# Patient Record
Sex: FEMALE | Race: WHITE | NOT HISPANIC OR LATINO
[De-identification: names, ages, dates, MRNs, and addresses within clinical notes are randomized per-mention and may not be internally consistent; named-entity substitution may affect disease eponyms.]

---

## 2017-02-23 ENCOUNTER — APPOINTMENT (OUTPATIENT)
Dept: OBGYN | Facility: CLINIC | Age: 52
End: 2017-02-23

## 2017-02-23 VITALS
BODY MASS INDEX: 25.23 KG/M2 | HEIGHT: 66 IN | WEIGHT: 157 LBS | SYSTOLIC BLOOD PRESSURE: 140 MMHG | DIASTOLIC BLOOD PRESSURE: 72 MMHG

## 2017-02-23 DIAGNOSIS — N92.4 EXCESSIVE BLEEDING IN THE PREMENOPAUSAL PERIOD: ICD-10-CM

## 2017-03-06 ENCOUNTER — APPOINTMENT (OUTPATIENT)
Dept: OBGYN | Facility: CLINIC | Age: 52
End: 2017-03-06

## 2017-03-06 VITALS
SYSTOLIC BLOOD PRESSURE: 140 MMHG | DIASTOLIC BLOOD PRESSURE: 80 MMHG | WEIGHT: 153 LBS | HEIGHT: 65 IN | BODY MASS INDEX: 25.49 KG/M2

## 2017-03-06 DIAGNOSIS — R10.2 PELVIC AND PERINEAL PAIN: ICD-10-CM

## 2017-04-27 ENCOUNTER — APPOINTMENT (OUTPATIENT)
Dept: OBGYN | Facility: CLINIC | Age: 52
End: 2017-04-27

## 2017-04-27 DIAGNOSIS — D25.9 LEIOMYOMA OF UTERUS, UNSPECIFIED: ICD-10-CM

## 2017-04-27 DIAGNOSIS — N80.9 ENDOMETRIOSIS, UNSPECIFIED: ICD-10-CM

## 2017-06-14 ENCOUNTER — EMERGENCY (EMERGENCY)
Facility: HOSPITAL | Age: 52
LOS: 1 days | Discharge: HOME | End: 2017-06-14
Attending: EMERGENCY MEDICINE | Admitting: EMERGENCY MEDICINE
Payer: COMMERCIAL

## 2017-06-14 VITALS
HEART RATE: 82 BPM | OXYGEN SATURATION: 98 % | RESPIRATION RATE: 16 BRPM | TEMPERATURE: 98 F | SYSTOLIC BLOOD PRESSURE: 151 MMHG | DIASTOLIC BLOOD PRESSURE: 97 MMHG

## 2017-06-14 VITALS
RESPIRATION RATE: 18 BRPM | OXYGEN SATURATION: 100 % | TEMPERATURE: 98 F | SYSTOLIC BLOOD PRESSURE: 150 MMHG | HEART RATE: 126 BPM | DIASTOLIC BLOOD PRESSURE: 98 MMHG

## 2017-06-14 LAB
ALBUMIN SERPL ELPH-MCNC: 4.1 G/DL — SIGNIFICANT CHANGE UP (ref 3.3–5)
ALP SERPL-CCNC: 64 U/L — SIGNIFICANT CHANGE UP (ref 40–120)
ALT FLD-CCNC: 14 U/L — SIGNIFICANT CHANGE UP (ref 4–33)
APPEARANCE UR: SIGNIFICANT CHANGE UP
AST SERPL-CCNC: 15 U/L — SIGNIFICANT CHANGE UP (ref 4–32)
BACTERIA # UR AUTO: SIGNIFICANT CHANGE UP
BASOPHILS # BLD AUTO: 0.01 K/UL — SIGNIFICANT CHANGE UP (ref 0–0.2)
BASOPHILS NFR BLD AUTO: 0.1 % — SIGNIFICANT CHANGE UP (ref 0–2)
BILIRUB SERPL-MCNC: 0.6 MG/DL — SIGNIFICANT CHANGE UP (ref 0.2–1.2)
BILIRUB UR-MCNC: NEGATIVE — SIGNIFICANT CHANGE UP
BLOOD UR QL VISUAL: HIGH
BUN SERPL-MCNC: 12 MG/DL — SIGNIFICANT CHANGE UP (ref 7–23)
CALCIUM SERPL-MCNC: 9.3 MG/DL — SIGNIFICANT CHANGE UP (ref 8.4–10.5)
CHLORIDE SERPL-SCNC: 98 MMOL/L — SIGNIFICANT CHANGE UP (ref 98–107)
CK MB BLD-MCNC: 1.04 NG/ML — SIGNIFICANT CHANGE UP (ref 1–4.7)
CK MB BLD-MCNC: 1.16 NG/ML — SIGNIFICANT CHANGE UP (ref 1–4.7)
CK MB BLD-MCNC: SIGNIFICANT CHANGE UP (ref 0–2.5)
CK MB BLD-MCNC: SIGNIFICANT CHANGE UP (ref 0–2.5)
CK SERPL-CCNC: 53 U/L — SIGNIFICANT CHANGE UP (ref 25–170)
CK SERPL-CCNC: 60 U/L — SIGNIFICANT CHANGE UP (ref 25–170)
CO2 SERPL-SCNC: 23 MMOL/L — SIGNIFICANT CHANGE UP (ref 22–31)
COLOR SPEC: SIGNIFICANT CHANGE UP
CREAT SERPL-MCNC: 0.76 MG/DL — SIGNIFICANT CHANGE UP (ref 0.5–1.3)
D DIMER BLD IA.RAPID-MCNC: < 150 NG/ML — SIGNIFICANT CHANGE UP
EOSINOPHIL # BLD AUTO: 0.01 K/UL — SIGNIFICANT CHANGE UP (ref 0–0.5)
EOSINOPHIL NFR BLD AUTO: 0.1 % — SIGNIFICANT CHANGE UP (ref 0–6)
GLUCOSE SERPL-MCNC: 111 MG/DL — HIGH (ref 70–99)
GLUCOSE UR-MCNC: NEGATIVE — SIGNIFICANT CHANGE UP
HBA1C BLD-MCNC: 5.7 % — HIGH (ref 4–5.6)
HCT VFR BLD CALC: 42.4 % — SIGNIFICANT CHANGE UP (ref 34.5–45)
HGB BLD-MCNC: 13.9 G/DL — SIGNIFICANT CHANGE UP (ref 11.5–15.5)
IMM GRANULOCYTES NFR BLD AUTO: 0.1 % — SIGNIFICANT CHANGE UP (ref 0–1.5)
KETONES UR-MCNC: NEGATIVE — SIGNIFICANT CHANGE UP
LEUKOCYTE ESTERASE UR-ACNC: HIGH
LYMPHOCYTES # BLD AUTO: 1.35 K/UL — SIGNIFICANT CHANGE UP (ref 1–3.3)
LYMPHOCYTES # BLD AUTO: 16.3 % — SIGNIFICANT CHANGE UP (ref 13–44)
MCHC RBC-ENTMCNC: 28.3 PG — SIGNIFICANT CHANGE UP (ref 27–34)
MCHC RBC-ENTMCNC: 32.8 % — SIGNIFICANT CHANGE UP (ref 32–36)
MCV RBC AUTO: 86.4 FL — SIGNIFICANT CHANGE UP (ref 80–100)
MONOCYTES # BLD AUTO: 0.34 K/UL — SIGNIFICANT CHANGE UP (ref 0–0.9)
MONOCYTES NFR BLD AUTO: 4.1 % — SIGNIFICANT CHANGE UP (ref 2–14)
MUCOUS THREADS # UR AUTO: SIGNIFICANT CHANGE UP
NEUTROPHILS # BLD AUTO: 6.58 K/UL — SIGNIFICANT CHANGE UP (ref 1.8–7.4)
NEUTROPHILS NFR BLD AUTO: 79.3 % — HIGH (ref 43–77)
NITRITE UR-MCNC: NEGATIVE — SIGNIFICANT CHANGE UP
PH UR: 7 — SIGNIFICANT CHANGE UP (ref 4.6–8)
PLATELET # BLD AUTO: 319 K/UL — SIGNIFICANT CHANGE UP (ref 150–400)
PMV BLD: 9.9 FL — SIGNIFICANT CHANGE UP (ref 7–13)
POTASSIUM SERPL-MCNC: 3.1 MMOL/L — LOW (ref 3.5–5.3)
POTASSIUM SERPL-SCNC: 3.1 MMOL/L — LOW (ref 3.5–5.3)
PROT SERPL-MCNC: 7.1 G/DL — SIGNIFICANT CHANGE UP (ref 6–8.3)
PROT UR-MCNC: 10 — SIGNIFICANT CHANGE UP
RBC # BLD: 4.91 M/UL — SIGNIFICANT CHANGE UP (ref 3.8–5.2)
RBC # FLD: 13.2 % — SIGNIFICANT CHANGE UP (ref 10.3–14.5)
RBC CASTS # UR COMP ASSIST: HIGH (ref 0–?)
SODIUM SERPL-SCNC: 138 MMOL/L — SIGNIFICANT CHANGE UP (ref 135–145)
SP GR SPEC: 1.01 — SIGNIFICANT CHANGE UP (ref 1–1.03)
SQUAMOUS # UR AUTO: SIGNIFICANT CHANGE UP
TROPONIN T SERPL-MCNC: < 0.06 NG/ML — SIGNIFICANT CHANGE UP (ref 0–0.06)
TROPONIN T SERPL-MCNC: < 0.06 NG/ML — SIGNIFICANT CHANGE UP (ref 0–0.06)
TSH SERPL-MCNC: 2.95 UIU/ML — SIGNIFICANT CHANGE UP (ref 0.27–4.2)
UROBILINOGEN FLD QL: NORMAL E.U. — SIGNIFICANT CHANGE UP (ref 0.1–0.2)
WBC # BLD: 8.3 K/UL — SIGNIFICANT CHANGE UP (ref 3.8–10.5)
WBC # FLD AUTO: 8.3 K/UL — SIGNIFICANT CHANGE UP (ref 3.8–10.5)
WBC UR QL: HIGH (ref 0–?)

## 2017-06-14 PROCEDURE — 71020: CPT | Mod: 26

## 2017-06-14 PROCEDURE — 99220: CPT

## 2017-06-14 RX ORDER — POTASSIUM CHLORIDE 20 MEQ
40 PACKET (EA) ORAL ONCE
Qty: 0 | Refills: 0 | Status: COMPLETED | OUTPATIENT
Start: 2017-06-14 | End: 2017-06-14

## 2017-06-14 RX ADMIN — Medication 40 MILLIEQUIVALENT(S): at 08:39

## 2017-06-14 NOTE — ED CDU PROVIDER NOTE - ATTENDING CONTRIBUTION TO CARE
Dr. Sandhu I performed a face to face evaluation of this patient and obtained a history and performed a full exam.  I agree with the history, physical exam and plan of the PA.

## 2017-06-14 NOTE — ED PROVIDER NOTE - ATTENDING CONTRIBUTION TO CARE
[place]  pt with palpitations   resolved here EKG sinus with no ischemic changes    exam unremarkable   not clinically hyperthyroid  will check TSH  Pt w/o recent travel  on OCP for menopause sx  will check dimer [place]  pt with palpitations   resolved here EKG sinus with no ischemic changes    exam unremarkable   not clinically hyperthyroid  will check TSH  Pt w/o recent travel  on OCP for menopause sx  will check dimer  EKG  no ischemic changes [place]  pt with palpitations   resolved here EKG sinus with no ischemic changes    exam unremarkable   not clinically hyperthyroid  will check TSH  Pt w/o recent travel  on OCP for menopause sx  will check dimer  EKG  no ischemic changes  Placed in CDU for second troponin monitoring

## 2017-06-14 NOTE — ED PROVIDER NOTE - OBJECTIVE STATEMENT
pt c/o palpitations which began about MN  resolved when arrived (about 5:30)   no assoc CP  vague c/o SOB  yet was able to go up and down stairs w/o feeling winded  Denies recent med change fever  GI sxs cough  Had associated shakiness  no fever   One prior episode 1 yr ago- no w/u  Pt notes ongoing anxiety over the last 2 mos

## 2017-06-14 NOTE — ED CDU PROVIDER NOTE - MEDICAL DECISION MAKING DETAILS
50 yo female htn  with palpitations upon arrival to ED, none at present, no complaints at present  Plan: Ce2, repeat EKG. 50 yo female htn  with palpitations upon arrival to ED, none at present, no complaints at present  Plan: Ce2, repeat EKG.  Fink att: Patient with palpitations.  No syncope or near syncope, no chest pain, nausea or diaphoresis.  No symptoms suggestive of ACS.  No e/o PE, no e/o hyperthyroid. symptoms resolved.  Patient declines to stay full 6 hour observation unit stay.  CDU for repeat troponin.

## 2017-06-14 NOTE — ED CDU PROVIDER NOTE - OBJECTIVE STATEMENT
52 yo female c/o palpitations which began about MN resolved when arrived (about 5:30)   no assoc CP  vague c/o SOB  yet was able to go up and down stairs w/o feeling winded  Denies recent med change fever  GI sxs cough  Had associated shakiness  no fever   One prior episode 1 yr ago- no w/u  Pt notes ongoing anxiety over the last 2 mos    CDU Provider Note: Agree with above. Pt 52 yo female pmhx of HTN, endometriosis on OCP presented to the ED with palpitations at 530am. Pt started feeling the palpitations around midnight, she was sitting when she first felt the palpitations, and they gradually became stronger and kept her up from sleeping, so she decided to bring herself to the ED. Currently pt states the palpitations had resolved around 7am in main ED. Denies associated chest pain, back pain, dizziness, weakness, numbness, tingling, fever, abdominal pain, diarrhea, constipation, urinary symptoms, recent travel, recent illness, social hx. Pt reports being more anxious since march due to nephews health issues.

## 2017-06-14 NOTE — ED PROVIDER NOTE - NS ED ATTENDING STATEMENT MOD
I have personally seen and examined this patient. I have fully participated in the care of this patient. I have reviewed all pertinent clinical information, including history physical exam, plan and the Resident's note and agree except as noted I have personally performed a face to face diagnostic evaluation on this patient. I have reviewed the ACP note and agree with the history, exam and plan of care, except as noted.

## 2017-06-14 NOTE — ED ADULT NURSE NOTE - CHIEF COMPLAINT QUOTE
pt. states "my heart has been pounding since midnight", reports it happening once before and pt. attributed it anxiety.  Pt. admits to feeling anxious d/t family events, states she felt SOB during the night but that has since subsided.  Pt. tachycardic in triage, EKG in progress.  PMHx HTN.

## 2017-06-14 NOTE — ED CDU PROVIDER NOTE - PHYSICAL EXAMINATION
Phoebe att: General: Well appearing, nontoxic, no acute distress; Head: Normocephalic Atraumatic; Eyes: PERRL, EOMI; ENT: Airway patent; Neck: supple; Chest: Lungs clear to auscultation bilateral; Cardiac: Regular rate and rhythm, no murmurs, rubs or gallops; Abdomen: soft, nontender, nondistended; no guarding or rebound; Musculoskeletal: Calves symmetric, nontender, no palpable cord; Skin: No rash, normal skin tone; Neuro: Alert and Oriented to person, place, and time; No focal deficit, CN 2-12 symmetric and intact

## 2017-06-14 NOTE — ED CDU PROVIDER NOTE - PLAN OF CARE
FOLLOW UP WITH YOUR PRIMARY MEDICAL DOCTOR IN 2-3 DAYS OR WITHIN THE WEEK. SHOW COPIES OF YOUR RESULTS/REPORTS GIVEN TO YOU.  Take all of your other medications as previously prescribed. Worsening or continued chest pain, palpitations, shortness of breath, weakness, return to ED.

## 2017-06-14 NOTE — ED ADULT TRIAGE NOTE - CHIEF COMPLAINT QUOTE
pt. states "my heart has been pounding since midnight", states it's happened once before and pt. attributed it anxiety.  Pt. admits to feeling anxious d/t family events, states she felt SOB during the night but that has since subsided.  Pt. tachycardic in triage, EKG in progress.  PMHx HTN. pt. states "my heart has been pounding since midnight", reports it happening once before and pt. attributed it anxiety.  Pt. admits to feeling anxious d/t family events, states she felt SOB during the night but that has since subsided.  Pt. tachycardic in triage, EKG in progress.  PMHx HTN.

## 2017-06-14 NOTE — ED ADULT NURSE NOTE - OBJECTIVE STATEMENT
pt. came in c/o palpitations since midnight today. as per pt, "I was doing scrap book when I started feeling my heart racing. I felt shaky and had dry mouth. I thought it's gonna go away like before but it didn't." pt. denies any chest pain nor SOB nor n/v. pt. states she feels less palpitations when she came to ED. pt. denies any fever nor dysuria nor recent travel and sick contacts. pt. on NSR on tele monitor. pt. with hx of HTN and compliant with her meds. labs drawn and sent. MD seen pt. awaits further eval. will continue to monitor

## 2017-06-14 NOTE — ED CDU PROVIDER NOTE - PROGRESS NOTE DETAILS
[facility] CDU attending discharge summary: No syncope or near syncope, no chest pain, nausea or diaphoresis.  No symptoms suggestive of ACS.  No e/o PE, no e/o hyperthyroid. symptoms resolved.  Patient declines to stay full 6 hour observation unit stay.   Sent to CDU for repeat troponin.  If negative d/c.  Patient informed of ED visit findings, understands plan.  Patient provided with written and further verbal instructions not included in discharge paperwork.  Patient instructed to follow up with their primary care physician in 2-3 days and return for new, worsened, or persistent symptoms. [facility] CDU attending discharge summary: No syncope or near syncope, no chest pain, nausea or diaphoresis.  No symptoms suggestive of ACS.  No e/o PE, no e/o hyperthyroid. symptoms resolved.  Patient declines to stay full 6 hour observation unit stay.   Sent to CDU for repeat troponin.  Patient denies any dysuria, frequency, or urgency.  Patient informed of ED visit findings, understands plan.  Patient provided with written and further verbal instructions not included in discharge paperwork.  Patient instructed to follow up with their primary care physician in 2-3 days and return for new, worsened, or persistent symptoms.

## 2017-06-14 NOTE — ED PROVIDER NOTE - MUSCULOSKELETAL, MLM
Spine appears normal, range of motion is not limited, no muscle or joint tenderness  no LE edema  no calf tenderness

## 2017-06-14 NOTE — ED PROVIDER NOTE - MEDICAL DECISION MAKING DETAILS
50 y/o female with a hx of HTN presents to the ER c/o palpitations pt is well appearing NAD, will check labs, EKG, CXR, reassess.

## 2017-06-14 NOTE — ED CDU PROVIDER NOTE - CHPI ED SYMPTOMS NEG
no back pain/no nausea/no chest pain/no vomiting/no dizziness/no fever/no syncope/no cough/no diaphoresis

## 2017-08-03 ENCOUNTER — APPOINTMENT (OUTPATIENT)
Dept: OBGYN | Facility: CLINIC | Age: 52
End: 2017-08-03
Payer: COMMERCIAL

## 2017-08-03 VITALS
BODY MASS INDEX: 28.27 KG/M2 | DIASTOLIC BLOOD PRESSURE: 80 MMHG | SYSTOLIC BLOOD PRESSURE: 150 MMHG | WEIGHT: 144 LBS | HEIGHT: 60 IN

## 2017-08-03 PROCEDURE — 99396 PREV VISIT EST AGE 40-64: CPT

## 2017-08-09 LAB — CYTOLOGY CVX/VAG DOC THIN PREP: NORMAL

## 2018-08-22 ENCOUNTER — APPOINTMENT (OUTPATIENT)
Dept: OBGYN | Facility: CLINIC | Age: 53
End: 2018-08-22
Payer: COMMERCIAL

## 2018-08-22 VITALS
HEIGHT: 60 IN | SYSTOLIC BLOOD PRESSURE: 144 MMHG | WEIGHT: 142 LBS | DIASTOLIC BLOOD PRESSURE: 80 MMHG | BODY MASS INDEX: 27.88 KG/M2

## 2018-08-22 PROCEDURE — 99396 PREV VISIT EST AGE 40-64: CPT

## 2018-08-27 LAB — CYTOLOGY CVX/VAG DOC THIN PREP: NORMAL

## 2018-10-08 ENCOUNTER — RX RENEWAL (OUTPATIENT)
Age: 53
End: 2018-10-08

## 2019-03-04 ENCOUNTER — MEDICATION RENEWAL (OUTPATIENT)
Age: 54
End: 2019-03-04

## 2019-04-24 ENCOUNTER — OTHER (OUTPATIENT)
Age: 54
End: 2019-04-24

## 2019-09-09 ENCOUNTER — APPOINTMENT (OUTPATIENT)
Dept: OBGYN | Facility: CLINIC | Age: 54
End: 2019-09-09
Payer: COMMERCIAL

## 2019-09-09 VITALS
DIASTOLIC BLOOD PRESSURE: 90 MMHG | WEIGHT: 148 LBS | SYSTOLIC BLOOD PRESSURE: 160 MMHG | BODY MASS INDEX: 29.06 KG/M2 | HEIGHT: 60 IN

## 2019-09-09 PROCEDURE — 99396 PREV VISIT EST AGE 40-64: CPT

## 2019-09-09 RX ORDER — ROSUVASTATIN CALCIUM 10 MG/1
10 TABLET, FILM COATED ORAL
Refills: 0 | Status: ACTIVE | COMMUNITY

## 2019-09-09 RX ORDER — METOPROLOL SUCCINATE 100 MG/1
100 TABLET, EXTENDED RELEASE ORAL
Refills: 0 | Status: ACTIVE | COMMUNITY

## 2019-09-09 NOTE — PHYSICAL EXAM
[Awake] : awake [Alert] : alert [Acute Distress] : no acute distress [LAD] : no lymphadenopathy [Thyroid Nodule] : no thyroid nodule [Mass] : no breast mass [Goiter] : no goiter [Nipple Discharge] : no nipple discharge [Soft] : soft [Axillary LAD] : no axillary lymphadenopathy [Tender] : non tender [Oriented x3] : oriented to person, place, and time [Uterine Adnexae] : were not tender and not enlarged [No Bleeding] : there was no active vaginal bleeding [Normal] : uterus [RRR, No Murmurs] : RRR, no murmurs [CTAB] : CTAB

## 2019-09-13 LAB — CYTOLOGY CVX/VAG DOC THIN PREP: NORMAL

## 2019-11-22 ENCOUNTER — MEDICATION RENEWAL (OUTPATIENT)
Age: 54
End: 2019-11-22

## 2019-11-25 ENCOUNTER — RX RENEWAL (OUTPATIENT)
Age: 54
End: 2019-11-25

## 2019-11-27 PROBLEM — R10.2 ADNEXAL PAIN: Status: ACTIVE | Noted: 2017-03-06

## 2020-05-12 ENCOUNTER — NON-APPOINTMENT (OUTPATIENT)
Age: 55
End: 2020-05-12

## 2020-09-09 ENCOUNTER — APPOINTMENT (OUTPATIENT)
Dept: OBGYN | Facility: CLINIC | Age: 55
End: 2020-09-09
Payer: COMMERCIAL

## 2020-09-09 VITALS
DIASTOLIC BLOOD PRESSURE: 78 MMHG | TEMPERATURE: 98.7 F | BODY MASS INDEX: 28.47 KG/M2 | SYSTOLIC BLOOD PRESSURE: 140 MMHG | WEIGHT: 145 LBS | HEIGHT: 60 IN

## 2020-09-09 DIAGNOSIS — Z01.419 ENCOUNTER FOR GYNECOLOGICAL EXAMINATION (GENERAL) (ROUTINE) W/OUT ABNORMAL FINDINGS: ICD-10-CM

## 2020-09-09 PROCEDURE — 99396 PREV VISIT EST AGE 40-64: CPT

## 2020-09-09 NOTE — PHYSICAL EXAM
[Awake] : awake [Alert] : alert [Acute Distress] : no acute distress [LAD] : no lymphadenopathy [Thyroid Nodule] : no thyroid nodule [Mass] : no breast mass [Goiter] : no goiter [Nipple Discharge] : no nipple discharge [Axillary LAD] : no axillary lymphadenopathy [Soft] : soft [Oriented x3] : oriented to person, place, and time [Tender] : non tender [Normal] : cervix [No Bleeding] : there was no active vaginal bleeding [Retroversion] : retroverted [Mass ___ cm] : a [unfilled] ~Ucm uterine mass was palpated [RRR, No Murmurs] : RRR, no murmurs [Uterine Adnexae] : were not tender and not enlarged [CTAB] : CTAB

## 2020-09-09 NOTE — HISTORY OF PRESENT ILLNESS
[Last Mammogram ___] : Last Mammogram was [unfilled] [Last Bone Density ___] : Last bone density studies [unfilled] [Last Pap ___] : Last cervical pap smear was [unfilled] [Perimenopausal] : is perimenopausal [Approximately ___ (Month)] : the LMP was approximately [unfilled] month(s) ago

## 2020-09-14 LAB — CYTOLOGY CVX/VAG DOC THIN PREP: NORMAL

## 2021-08-26 ENCOUNTER — NON-APPOINTMENT (OUTPATIENT)
Age: 56
End: 2021-08-26

## 2021-09-13 ENCOUNTER — APPOINTMENT (OUTPATIENT)
Dept: OBGYN | Facility: CLINIC | Age: 56
End: 2021-09-13
Payer: COMMERCIAL

## 2021-09-13 VITALS
HEIGHT: 60 IN | DIASTOLIC BLOOD PRESSURE: 88 MMHG | SYSTOLIC BLOOD PRESSURE: 140 MMHG | BODY MASS INDEX: 25.91 KG/M2 | WEIGHT: 132 LBS

## 2021-09-13 PROCEDURE — 99396 PREV VISIT EST AGE 40-64: CPT

## 2021-09-13 NOTE — REVIEW OF SYSTEMS
[Night Sweats] : night sweats [Arthralgias] : arthralgias [Myalgias] : myalgias [Headache] : headache [Anxiety] : anxiety [Hot Flashes] : hot flashes [Negative] : Heme/Lymph

## 2021-09-13 NOTE — HISTORY OF PRESENT ILLNESS
[FreeTextEntry1] : patient presents today for routine annual exam.\par  [postmenopausal] : postmenopausal [TextBox_4] : no complaints  [Mammogramdate] : 12/2020 [PapSmeardate] : 9/2020 [BoneDensityDate] : 12/2019

## 2021-09-17 LAB — CYTOLOGY CVX/VAG DOC THIN PREP: NORMAL

## 2022-11-28 ENCOUNTER — APPOINTMENT (OUTPATIENT)
Dept: OBGYN | Facility: CLINIC | Age: 57
End: 2022-11-28

## 2022-11-28 VITALS
BODY MASS INDEX: 22.49 KG/M2 | SYSTOLIC BLOOD PRESSURE: 140 MMHG | HEIGHT: 65 IN | WEIGHT: 135 LBS | DIASTOLIC BLOOD PRESSURE: 80 MMHG

## 2022-11-28 DIAGNOSIS — Z01.411 ENCOUNTER FOR GYNECOLOGICAL EXAMINATION (GENERAL) (ROUTINE) WITH ABNORMAL FINDINGS: ICD-10-CM

## 2022-11-28 PROCEDURE — 99396 PREV VISIT EST AGE 40-64: CPT

## 2022-11-28 RX ORDER — LEVONORGESTREL AND ETHINYL ESTRADIOL 0.15-0.03
0.15-0.03 KIT ORAL
Qty: 91 | Refills: 2 | Status: DISCONTINUED | COMMUNITY
Start: 2017-02-23 | End: 2022-11-28

## 2022-12-09 LAB — CYTOLOGY CVX/VAG DOC THIN PREP: ABNORMAL

## 2023-11-29 ENCOUNTER — APPOINTMENT (OUTPATIENT)
Dept: OBGYN | Facility: CLINIC | Age: 58
End: 2023-11-29
Payer: COMMERCIAL

## 2023-11-29 VITALS
BODY MASS INDEX: 22.49 KG/M2 | WEIGHT: 135 LBS | HEIGHT: 65 IN | DIASTOLIC BLOOD PRESSURE: 84 MMHG | SYSTOLIC BLOOD PRESSURE: 156 MMHG

## 2023-11-29 DIAGNOSIS — Z01.419 ENCOUNTER FOR GYNECOLOGICAL EXAMINATION (GENERAL) (ROUTINE) W/OUT ABNORMAL FINDINGS: ICD-10-CM

## 2023-11-29 PROCEDURE — 99396 PREV VISIT EST AGE 40-64: CPT

## 2023-11-29 RX ORDER — BLOOD SUGAR DIAGNOSTIC
100 STRIP MISCELLANEOUS
Refills: 0 | Status: DISCONTINUED | COMMUNITY
End: 2023-11-29

## 2023-11-29 RX ORDER — KRILL/OM-3/DHA/EPA/PHOSPHO/AST 350MG-90MG
350 CAPSULE ORAL
Refills: 0 | Status: DISCONTINUED | COMMUNITY
End: 2023-11-29

## 2023-12-11 LAB — CYTOLOGY CVX/VAG DOC THIN PREP: NORMAL

## 2024-12-04 ENCOUNTER — APPOINTMENT (OUTPATIENT)
Dept: OBGYN | Facility: CLINIC | Age: 59
End: 2024-12-04
Payer: COMMERCIAL

## 2024-12-04 VITALS
WEIGHT: 131 LBS | DIASTOLIC BLOOD PRESSURE: 82 MMHG | HEIGHT: 65 IN | SYSTOLIC BLOOD PRESSURE: 152 MMHG | BODY MASS INDEX: 21.83 KG/M2

## 2024-12-04 DIAGNOSIS — Z01.419 ENCOUNTER FOR GYNECOLOGICAL EXAMINATION (GENERAL) (ROUTINE) W/OUT ABNORMAL FINDINGS: ICD-10-CM

## 2024-12-04 PROCEDURE — 99396 PREV VISIT EST AGE 40-64: CPT

## 2024-12-06 RX ORDER — CYST/ALA/Q10/PHOS.SER/DHA/BROC 100-20-50
POWDER (GRAM) ORAL
Refills: 0 | Status: ACTIVE | COMMUNITY

## 2024-12-08 LAB — CYTOLOGY CVX/VAG DOC THIN PREP: NORMAL

## 2025-03-31 ENCOUNTER — APPOINTMENT (OUTPATIENT)
Dept: OBGYN | Facility: CLINIC | Age: 60
End: 2025-03-31
Payer: COMMERCIAL

## 2025-03-31 VITALS
DIASTOLIC BLOOD PRESSURE: 90 MMHG | WEIGHT: 132 LBS | HEIGHT: 65 IN | BODY MASS INDEX: 21.99 KG/M2 | SYSTOLIC BLOOD PRESSURE: 162 MMHG

## 2025-03-31 DIAGNOSIS — S30.23XA CONTUSION OF VAGINA AND VULVA, INITIAL ENCOUNTER: ICD-10-CM

## 2025-03-31 PROCEDURE — 99213 OFFICE O/P EST LOW 20 MIN: CPT
